# Patient Record
Sex: MALE | Race: WHITE | URBAN - NONMETROPOLITAN AREA
[De-identification: names, ages, dates, MRNs, and addresses within clinical notes are randomized per-mention and may not be internally consistent; named-entity substitution may affect disease eponyms.]

---

## 2020-11-11 ENCOUNTER — VIRTUAL VISIT (OUTPATIENT)
Dept: FAMILY MEDICINE CLINIC | Age: 55
End: 2020-11-11
Payer: COMMERCIAL

## 2020-11-11 DIAGNOSIS — G20 PARKINSON'S DISEASE (HCC): Primary | ICD-10-CM

## 2020-11-11 DIAGNOSIS — I10 ESSENTIAL HYPERTENSION: ICD-10-CM

## 2020-11-11 PROCEDURE — 99442 PR PHYS/QHP TELEPHONE EVALUATION 11-20 MIN: CPT | Performed by: FAMILY MEDICINE

## 2020-11-11 RX ORDER — NEBIVOLOL HYDROCHLORIDE 10 MG/1
TABLET ORAL
Qty: 90 TAB | Refills: 3 | Status: SHIPPED | OUTPATIENT
Start: 2020-11-11

## 2020-11-11 RX ORDER — OLMESARTAN MEDOXOMIL 40 MG/1
TABLET ORAL
Qty: 90 TAB | Refills: 3 | Status: SHIPPED | OUTPATIENT
Start: 2020-11-11

## 2020-11-11 RX ORDER — AMLODIPINE BESYLATE 5 MG/1
TABLET ORAL
Qty: 90 TAB | Refills: 1 | Status: SHIPPED | OUTPATIENT
Start: 2020-11-11 | End: 2021-05-20

## 2020-11-11 NOTE — PROGRESS NOTES
Deborah Mncamara is a 54 y.o. male, evaluated via audio-only technology on 11/11/2020 for No chief complaint on file. .    Assessment & Plan: Hypertension, Parkinson's disease: I have not seen this patient in over a year. He actually had a deep brain stimulation for his Parkinson's disease this was a 15-minute visit via voice to voice communication I was in my office he was at his work the stimulation has been phenomenal.  He is not taking any medication but his blood pressure medication. His blood pressures have actually done quite well. He does not want to come into the doctor's office at this point and I recommended that we do that definitely the beginning of the year he will probably need some blood work at this point. 12  Subjective: And is a 49-year-old gentleman have not seen in over a year. He has had no fever no chills. Eating relatively well. Bowel movements have been appropriate. No cough or cold no chest pain or shortness of breath. In November 2019 he had deep nerve stimulation to the brain for Parkinson's disease he tells me it has been phenomenal he has no more shakes in fact no one would even know that he had Parkinson's disease at this point. He has had no congestion or cough blood pressure medications refilled and he is insistent that his blood pressures have been excellent. No falls or injuries no rash no syncope no loss of consciousness. Nobody at home has been sick. Today that he feels like he is a new man he has a new life his Parkinson's is gone       Prior to Admission medications    Not on File     There is no problem list on file for this patient. Review of Systems   Constitutional: Negative. HENT: Negative. Eyes: Negative. Respiratory: Negative. Cardiovascular: Negative. Gastrointestinal: Negative. Genitourinary: Negative. Musculoskeletal: Negative. Skin: Negative. Neurological: Negative. Endo/Heme/Allergies: Negative. Psychiatric/Behavioral: Negative. No flowsheet data found. Conrado Torres, who was evaluated through a patient-initiated, synchronous (real-time) audio only encounter, and/or her healthcare decision maker, is aware that it is a billable service, with coverage as determined by his insurance carrier. He provided verbal consent to proceed: n/a- consent obtained within past 12 months. He has not had a related appointment within my department in the past 7 days or scheduled within the next 24 hours.       Total Time: minutes: 11-20 minutes    Divina Villalta MD

## 2021-05-20 RX ORDER — AMLODIPINE BESYLATE 5 MG/1
TABLET ORAL
Qty: 90 TABLET | Refills: 1 | Status: SHIPPED | OUTPATIENT
Start: 2021-05-20